# Patient Record
Sex: FEMALE | Race: BLACK OR AFRICAN AMERICAN | NOT HISPANIC OR LATINO | Employment: STUDENT | URBAN - METROPOLITAN AREA
[De-identification: names, ages, dates, MRNs, and addresses within clinical notes are randomized per-mention and may not be internally consistent; named-entity substitution may affect disease eponyms.]

---

## 2020-02-23 ENCOUNTER — HOSPITAL ENCOUNTER (EMERGENCY)
Facility: HOSPITAL | Age: 20
Discharge: HOME | End: 2020-02-23
Attending: EMERGENCY MEDICINE
Payer: COMMERCIAL

## 2020-02-23 VITALS
SYSTOLIC BLOOD PRESSURE: 100 MMHG | DIASTOLIC BLOOD PRESSURE: 79 MMHG | OXYGEN SATURATION: 100 % | HEART RATE: 67 BPM | TEMPERATURE: 97.9 F | RESPIRATION RATE: 18 BRPM

## 2020-02-23 DIAGNOSIS — I80.8 SUPERFICIAL THROMBOPHLEBITIS OF RIGHT UPPER EXTREMITY: Primary | ICD-10-CM

## 2020-02-23 PROCEDURE — 99283 EMERGENCY DEPT VISIT LOW MDM: CPT

## 2020-02-23 PROCEDURE — 99284 EMERGENCY DEPT VISIT MOD MDM: CPT | Performed by: EMERGENCY MEDICINE

## 2020-02-23 RX ORDER — IBUPROFEN 400 MG/1
400 TABLET ORAL ONCE
Status: COMPLETED | OUTPATIENT
Start: 2020-02-23 | End: 2020-02-23

## 2020-02-23 RX ORDER — CEPHALEXIN 250 MG/1
500 CAPSULE ORAL ONCE
Status: COMPLETED | OUTPATIENT
Start: 2020-02-23 | End: 2020-02-23

## 2020-02-23 RX ORDER — CEPHALEXIN 500 MG/1
500 CAPSULE ORAL EVERY 12 HOURS SCHEDULED
Qty: 10 CAPSULE | Refills: 0 | Status: SHIPPED | OUTPATIENT
Start: 2020-02-23 | End: 2020-02-28

## 2020-02-23 RX ADMIN — IBUPROFEN 400 MG: 400 TABLET ORAL at 22:53

## 2020-02-23 RX ADMIN — CEPHALEXIN 500 MG: 250 CAPSULE ORAL at 22:53

## 2020-02-24 NOTE — ED ATTENDING ATTESTATION
2/23/2020  I, Jaki Marquez MD, saw and evaluated the patient  I have discussed the patient with the resident/non-physician practitioner and agree with the resident's/non-physician practitioner's findings, Plan of Care, and MDM as documented in the resident's/non-physician practitioner's note, except where noted  All available labs and Radiology studies were reviewed  I was present for key portions of any procedure(s) performed by the resident/non-physician practitioner and I was immediately available to provide assistance  At this point I agree with the current assessment done in the Emergency Department  I have conducted an independent evaluation of this patient a history and physical is as follows:    ED Course         Critical Care Time  Procedures    Patient is a pleasant 20 yof who presents with superficial thrombophlebitis after a IV placement  Right AC  Present for the past two days  Some mild associated redness  No arm edema  MDM 19 yof, some achi non-radiating pain over the area  Will treat as superficial thrombophlebitis with mild cellulitis  Discussed return precautions for DVT  Discussed with the patient who agrees with the workup and plan, understands return precautions and followup instructions

## 2020-02-24 NOTE — ED PROVIDER NOTES
History  Chief Complaint   Patient presents with    Arm Pain     Pt reports going to ER at school on Thursday and having IV placed in R arm; pt states site is now painful with extension of arm; denies fevers/chills     43-year-old woman with a past medical history of asthma presents for evaluation of right arm pain  Patient states that 4 days ago she had an IV placed at an outside hospital for IV hydration secondary to gastroenteritis  She states that her symptoms have improved  Of the past 24 hours she has noticed pain in her right arm at the IV site  She has noticed some swelling/hardness in her right antecubital fossa  She denies rash  She denies any trauma  The symptoms are worse with moving her arm and relieved with rest   She is right-hand dominant  She has not taken any medication for the symptoms  She denies shortness of breath, chest pain  She has no other symptoms on review of systems          None       Past Medical History:   Diagnosis Date    Asthma        History reviewed  No pertinent surgical history  History reviewed  No pertinent family history  I have reviewed and agree with the history as documented  Social History     Tobacco Use    Smoking status: Never Smoker    Smokeless tobacco: Never Used   Substance Use Topics    Alcohol use: Not Currently    Drug use: Not Currently        Review of Systems   Constitutional: Negative for appetite change, chills, diaphoresis, fatigue and fever  HENT: Negative for congestion, rhinorrhea and sore throat  Respiratory: Negative for cough, shortness of breath, wheezing and stridor  Cardiovascular: Negative for chest pain, palpitations and leg swelling  Gastrointestinal: Negative for abdominal distention, abdominal pain, constipation, diarrhea, nausea and vomiting  Endocrine: Negative for polydipsia and polyuria  Genitourinary: Negative for dysuria and hematuria  Musculoskeletal: Positive for arthralgias (Right arm)  Negative for back pain, neck pain and neck stiffness  Skin: Negative for pallor, rash and wound  Neurological: Negative for dizziness, light-headedness and headaches  Psychiatric/Behavioral: Negative for behavioral problems and confusion  All other systems reviewed and are negative  Physical Exam  ED Triage Vitals [02/23/20 2212]   Temperature Pulse Respirations Blood Pressure SpO2   97 9 °F (36 6 °C) 67 18 100/79 100 %      Temp Source Heart Rate Source Patient Position - Orthostatic VS BP Location FiO2 (%)   Oral Monitor Sitting Left arm --      Pain Score       8             Orthostatic Vital Signs  Vitals:    02/23/20 2212   BP: 100/79   Pulse: 67   Patient Position - Orthostatic VS: Sitting       Physical Exam   Constitutional: She is oriented to person, place, and time  She appears well-developed and well-nourished  No distress  HENT:   Head: Normocephalic and atraumatic  Eyes: Conjunctivae are normal  No scleral icterus  Neck: Normal range of motion  Neck supple  Cardiovascular: Normal rate, regular rhythm, normal heart sounds and intact distal pulses  No murmur heard  Pulmonary/Chest: Effort normal and breath sounds normal  No respiratory distress  She has no wheezes  Abdominal: Soft  Bowel sounds are normal  She exhibits no distension  There is no tenderness  Musculoskeletal: Normal range of motion  She exhibits no edema, tenderness or deformity  Arms:  Neurological: She is alert and oriented to person, place, and time  Skin: Skin is warm and dry  No rash noted  She is not diaphoretic  No erythema  No pallor  Psychiatric: She has a normal mood and affect  Her behavior is normal    Nursing note and vitals reviewed        ED Medications  Medications   cephalexin (KEFLEX) capsule 500 mg (500 mg Oral Given 2/23/20 2253)   ibuprofen (MOTRIN) tablet 400 mg (400 mg Oral Given 2/23/20 2253)       Diagnostic Studies  Results Reviewed     None                 No orders to display Procedures  Procedures      ED Course                               MDM  Number of Diagnoses or Management Options  Superficial thrombophlebitis of right upper extremity: new and requires workup  Diagnosis management comments: Superficial thrombophlebitis following IV stick  There is no obvious rash, redness the patient is dark complexion  Will treat with Keflex for 5 days, NSAIDs, warm compresses  Return precautions discussed  Amount and/or Complexity of Data Reviewed  Decide to obtain previous medical records or to obtain history from someone other than the patient: yes  Obtain history from someone other than the patient: yes  Review and summarize past medical records: yes  Discuss the patient with other providers: yes  Independent visualization of images, tracings, or specimens: yes    Risk of Complications, Morbidity, and/or Mortality  Presenting problems: minimal  Diagnostic procedures: minimal  Management options: minimal    Patient Progress  Patient progress: stable        Disposition  Final diagnoses:   Superficial thrombophlebitis of right upper extremity     Time reflects when diagnosis was documented in both MDM as applicable and the Disposition within this note     Time User Action Codes Description Comment    2/23/2020 10:43 PM Jorgito Rivera Add [I80 8] Superficial thrombophlebitis of right upper extremity       ED Disposition     ED Disposition Condition Date/Time Comment    Discharge Stable Sun Feb 23, 2020 10:42 PM Bel Rodrigues discharge to home/self care              Follow-up Information     Follow up With Specialties Details Why Contact Info Additional [address] MD Family Medicine   [place] [id number]       [address] [place] [address] Emergency Department Emergency Medicine   [address] [address] [id number]  [phone]  ED, [address] [place] [address] Discharge Medication List as of 2/23/2020 10:43 PM      START taking these medications    Details   cephalexin (KEFLEX) 500 mg capsule Take 1 capsule (500 mg total) by mouth every 12 (twelve) hours for 5 days, Starting Sun 2/23/2020, Until Fri 2/28/2020, Print           No discharge procedures on file  ED Provider  Attending physically available and evaluated Tyson Gomes I managed the patient along with the ED Attending      Electronically Signed by         Ina Navas MD  02/24/20 6896

## 2021-03-31 DIAGNOSIS — Z23 ENCOUNTER FOR IMMUNIZATION: ICD-10-CM

## 2024-04-05 ENCOUNTER — HOSPITAL ENCOUNTER (EMERGENCY)
Facility: HOSPITAL | Age: 24
Discharge: HOME | End: 2024-04-06
Attending: EMERGENCY MEDICINE
Payer: COMMERCIAL

## 2024-04-05 ENCOUNTER — APPOINTMENT (EMERGENCY)
Dept: RADIOLOGY | Facility: HOSPITAL | Age: 24
End: 2024-04-05
Payer: COMMERCIAL

## 2024-04-05 VITALS
RESPIRATION RATE: 18 BRPM | DIASTOLIC BLOOD PRESSURE: 77 MMHG | WEIGHT: 130 LBS | BODY MASS INDEX: 23.04 KG/M2 | OXYGEN SATURATION: 100 % | HEART RATE: 96 BPM | HEIGHT: 63 IN | TEMPERATURE: 98.4 F | SYSTOLIC BLOOD PRESSURE: 104 MMHG

## 2024-04-05 DIAGNOSIS — N39.0 UTI (URINARY TRACT INFECTION): ICD-10-CM

## 2024-04-05 DIAGNOSIS — K59.00 CONSTIPATION: ICD-10-CM

## 2024-04-05 DIAGNOSIS — K64.4 EXTERNAL HEMORRHOID: Primary | ICD-10-CM

## 2024-04-05 LAB
BACTERIA UR QL AUTO: ABNORMAL /HPF
BILIRUB UR QL STRIP: NEGATIVE
CLARITY UR: CLEAR
COLOR UR: ABNORMAL
GLUCOSE UR STRIP-MCNC: NEGATIVE MG/DL
HGB UR QL STRIP.AUTO: ABNORMAL
KETONES UR STRIP-MCNC: NEGATIVE MG/DL
LEUKOCYTE ESTERASE UR QL STRIP: ABNORMAL
MUCOUS THREADS UR QL AUTO: ABNORMAL
NITRITE UR QL STRIP: NEGATIVE
NON-SQ EPI CELLS URNS QL MICRO: ABNORMAL /HPF
PH UR STRIP.AUTO: 6 [PH]
PROT UR STRIP-MCNC: ABNORMAL MG/DL
RBC #/AREA URNS AUTO: ABNORMAL /HPF
SP GR UR STRIP.AUTO: 1.02 (ref 1–1.03)
UROBILINOGEN UR STRIP-ACNC: <2 MG/DL
WBC #/AREA URNS AUTO: ABNORMAL /HPF

## 2024-04-05 PROCEDURE — 81001 URINALYSIS AUTO W/SCOPE: CPT

## 2024-04-05 PROCEDURE — 74018 RADEX ABDOMEN 1 VIEW: CPT

## 2024-04-05 PROCEDURE — 99284 EMERGENCY DEPT VISIT MOD MDM: CPT

## 2024-04-05 PROCEDURE — 96361 HYDRATE IV INFUSION ADD-ON: CPT

## 2024-04-05 PROCEDURE — 82272 OCCULT BLD FECES 1-3 TESTS: CPT

## 2024-04-05 PROCEDURE — 99283 EMERGENCY DEPT VISIT LOW MDM: CPT

## 2024-04-05 PROCEDURE — 87086 URINE CULTURE/COLONY COUNT: CPT

## 2024-04-05 RX ADMIN — SODIUM CHLORIDE 1000 ML: 0.9 INJECTION, SOLUTION INTRAVENOUS at 22:46

## 2024-04-05 RX ADMIN — GLYCERIN 1 SUPPOSITORY: 2 SUPPOSITORY RECTAL at 22:39

## 2024-04-06 PROCEDURE — 96365 THER/PROPH/DIAG IV INF INIT: CPT

## 2024-04-06 RX ORDER — CEPHALEXIN 500 MG/1
500 CAPSULE ORAL 4 TIMES DAILY
Qty: 20 CAPSULE | Refills: 0 | Status: SHIPPED | OUTPATIENT
Start: 2024-04-06 | End: 2024-04-11

## 2024-04-06 RX ADMIN — DEXTROSE 1000 MG: 50 INJECTION, SOLUTION INTRAVENOUS at 00:07

## 2024-04-06 NOTE — DISCHARGE INSTRUCTIONS
Continue taking Colace.  Increase your fluid intake and increase your fiber intake.  Please take your ceftriaxone for your UTI 4 times a day for the next 5 days. Please use Tucks topical pads to treat the hemorrhoid. Please follow up with obgyn and your primary care regarding your visit today. Return to the ED if your symptoms return or if you experience significant rectal bleeding.

## 2024-04-06 NOTE — ED PROVIDER NOTES
History  Chief Complaint   Patient presents with    Rectal Pain     Pt had a baby a couple weeks ago and has a hemorrhoid that she noticed today. Last BM was last Friday. Has tried colace and a fleet enema with no relief. Reports it has been burning when she urinates started yesterday      Bel Rodrigues is a 23 y.o. female with a PMH of asthma presenting to the ED on April 5, 2024 with rectal pain. Patient endorses that she had a baby on 317 via vaginal delivery.  She states that she has not been able to have a bowel movement since Friday.  She tried to do an over the counter fleets enema at home but was unable to insert the enema fully.  She is also tried taking Colace at home without relief.  Patient states that she has had rectal pain since she has been constipated however did not notice that she had a large hemorrhoid until today when she attempted the enema.  Patient is also endorsing dysuria and frequency today. Patient denies blood in her urine, abdominal pain, flank tenderness, bright red blood per rectum or any other complaints at this time.             None       Past Medical History:   Diagnosis Date    Asthma        History reviewed. No pertinent surgical history.    History reviewed. No pertinent family history.  I have reviewed and agree with the history as documented.    E-Cigarette/Vaping    E-Cigarette Use Never User      E-Cigarette/Vaping Substances     Social History     Tobacco Use    Smoking status: Never    Smokeless tobacco: Never   Vaping Use    Vaping status: Never Used   Substance Use Topics    Alcohol use: Not Currently    Drug use: Not Currently       Review of Systems   Constitutional:  Negative for chills and fever.   Eyes:  Negative for pain and visual disturbance.   Respiratory:  Negative for cough and shortness of breath.    Cardiovascular:  Negative for chest pain and palpitations.   Gastrointestinal:  Positive for constipation. Negative for abdominal pain, nausea and vomiting.    Genitourinary:  Positive for dysuria and frequency. Negative for difficulty urinating, flank pain, hematuria and urgency.   Musculoskeletal:  Negative for arthralgias and back pain.   Skin:  Negative for color change and rash.   Neurological:  Negative for seizures and syncope.   All other systems reviewed and are negative.      Physical Exam  Physical Exam  Vitals and nursing note reviewed.   Constitutional:       General: She is not in acute distress.     Appearance: Normal appearance. She is normal weight. She is not ill-appearing, toxic-appearing or diaphoretic.   HENT:      Head: Normocephalic and atraumatic.      Right Ear: External ear normal.      Left Ear: External ear normal.      Nose: Nose normal. No congestion or rhinorrhea.      Mouth/Throat:      Mouth: Mucous membranes are moist.   Eyes:      General: No scleral icterus.        Right eye: No discharge.         Left eye: No discharge.      Extraocular Movements: Extraocular movements intact.      Conjunctiva/sclera: Conjunctivae normal.   Cardiovascular:      Rate and Rhythm: Normal rate and regular rhythm.      Heart sounds: Normal heart sounds. No murmur heard.     No friction rub. No gallop.   Pulmonary:      Effort: Pulmonary effort is normal. No respiratory distress.      Breath sounds: Normal breath sounds. No wheezing, rhonchi or rales.   Abdominal:      General: Bowel sounds are normal. There is no distension.      Palpations: Abdomen is soft.      Tenderness: There is abdominal tenderness. There is no guarding or rebound.      Comments: Tenderness to deep palpation in her left lower quadrant   Genitourinary:     Rectum: Guaiac result positive. External hemorrhoid present. No anal fissure or internal hemorrhoid. Normal anal tone.       Musculoskeletal:         General: No signs of injury. Normal range of motion.      Cervical back: Normal range of motion and neck supple. No rigidity.   Skin:     General: Skin is warm.      Capillary Refill:  Capillary refill takes less than 2 seconds.      Coloration: Skin is not pale.      Findings: No erythema.   Neurological:      General: No focal deficit present.      Mental Status: She is alert and oriented to person, place, and time. Mental status is at baseline.      Motor: No weakness.      Gait: Gait normal.   Psychiatric:         Mood and Affect: Mood normal.         Behavior: Behavior normal.         Vital Signs  ED Triage Vitals [04/05/24 2111]   Temperature Pulse Respirations Blood Pressure SpO2   98.4 °F (36.9 °C) 96 18 104/77 100 %      Temp Source Heart Rate Source Patient Position - Orthostatic VS BP Location FiO2 (%)   Oral Monitor -- Right arm --      Pain Score       --           Vitals:    04/05/24 2111   BP: 104/77   Pulse: 96         Visual Acuity      ED Medications  Medications   glycerin (adult) rectal suppository 1 suppository (1 suppository Rectal Given 4/5/24 2239)   sodium chloride 0.9 % bolus 1,000 mL (0 mL Intravenous Stopped 4/6/24 0007)   ceftriaxone (ROCEPHIN) 1 g/50 mL in dextrose IVPB (0 mg Intravenous Stopped 4/6/24 0047)       Diagnostic Studies  Results Reviewed       Procedure Component Value Units Date/Time    Urine Microscopic [[id number]]  (Abnormal) Collected: 04/05/24 2150    Lab Status: Final result Specimen: Urine, Clean Catch Updated: 04/05/24 2311     RBC, UA 4-10 /hpf      WBC, UA 10-20 /hpf      Epithelial Cells None Seen /hpf      Bacteria, UA Occasional /hpf      MUCUS THREADS Occasional    Urine culture [[id number]] Collected: 04/05/24 2150    Lab Status: In process Specimen: Urine, Clean Catch Updated: 04/05/24 2311    UA w Reflex to Microscopic w Reflex to Culture [[id number]]  (Abnormal) Collected: 04/05/24 2150    Lab Status: Final result Specimen: Urine, Clean Catch Updated: 04/05/24 2241     Color, UA Light Yellow     Clarity, UA Clear     Specific Gravity, UA 1.025     pH, UA 6.0     Leukocytes, UA Large     Nitrite, UA Negative     Protein, UA Trace mg/dl       Glucose, UA Negative mg/dl      Ketones, UA Negative mg/dl      Urobilinogen, UA <2.0 mg/dl      Bilirubin, UA Negative     Occult Blood, UA Small                   XR abdomen 1 view kub   ED Interpretation by Lexis Duque PA-C (04/05 2232)   Constipation noted                 Procedures  Procedures         ED Course  ED Course as of 04/06/24 0221 Fri Apr 05, 2024   2313 Urine Microscopic(!)  Will treat                                             Medical Decision Making  Patient seen and examined noted to have external hemorrhoid, constipation and UTI.  Chaperone was present when rectal exam was performed, large external hemorrhoid noted.  No internal hemorrhoids on my exam.  Patient states that she is not able to have a bowel movement for over a week.  Gave patient a glycerin suppository everything and was unsuccessful.  Moved to a soapsuds enema which relieved patient's constipation.  Patient was prescribed Tucks pads to use for her hemorrhoid.  She was prescribed Keflex to treat her UTI and she received her first dose of Rocephin here through her IV.  Strict return precautions were given which patient expressed her understanding of.    Differential diagnosis includes but is not limited to hemorrhoid, fissure, constipation, obstipation, pseudo-obstruction, fecal impaction, bowel obstruction, ileus; doubt acute surgical intraabdominal process.       Patient's labs notable for: Urinary tract infection    Imaging revealed:   Constipation on KUB     Patient appears well, is nontoxic appearing, she expresses understanding and agrees with plan of care at this time.  In light of this patient would benefit from outpatient management.    Patient was rx'd: keflex and tucks pads    Patient at time of discharge well-appearing in no acute distress, all questions answered. Patient agreeable to plan.  Patient's vitals, lab/imaging results, diagnosis, and treatment plan were discussed with the patient. All new/changed  "medications were discussed with patient, specifically, route of administration, how often and when to take, and where they can be picked up. Strict return precautions as well as close follow up with PCP was discussed with the patient and the patient was agreeable to my recommendations. Patient verbally acknowledged understanding of the above communications. Strict return precautions were provided. All labs reviewed and utilized in the medical decision making process (if labs were ordered). Portions of the record may have been created with voice recognition software.  Occasional wrong word or \"sound a like\" substitutions may have occurred due to the inherent limitations of voice recognition software.  Read the chart carefully and recognize, using context, where substitutions have occurred.      Amount and/or Complexity of Data Reviewed  Labs: ordered. Decision-making details documented in ED Course.  Radiology: ordered and independent interpretation performed.    Risk  OTC drugs.  Prescription drug management.             Disposition  Final diagnoses:   External hemorrhoid   Constipation   UTI (urinary tract infection)     Time reflects when diagnosis was documented in both MDM as applicable and the Disposition within this note       Time User Action Codes Description Comment    4/6/2024 12:12 AM Lexis Duque Add [K64.4] External hemorrhoid     4/6/2024 12:12 AM Lexis Duque Add [K59.00] Constipation     4/6/2024 12:13 AM Lexis Duque Add [N39.0] UTI (urinary tract infection)           ED Disposition       ED Disposition   Discharge    Condition   Stable    Date/Time   Sat Apr 6, 2024 12:12 AM    Comment   Bel Rodrigues discharge to home/self care.                   Follow-up Information       Follow up With Specialties Details Why Contact Info Additional Information    Kaleigh Perez MD Family Medicine   [address] [place] [address]  [address]  [facility] [id number] [phone]       Critical access hospital " Emergency Department Emergency Medicine   [address] [facility] [address]  [phone] [facility] Emergency Department, [address] [place], [place], [address]            Discharge Medication List as of 4/6/2024 12:22 AM        START taking these medications    Details   cephalexin (KEFLEX) 500 mg capsule Take 1 capsule (500 mg total) by mouth 4 (four) times a day for 5 days, Starting Sat 4/6/2024, Until Thu 4/11/2024, Normal      witch hazel-glycerin (TUCKS) topical pad Insert 1 Pad into the rectum as needed for irritation, Starting Sat 4/6/2024, Normal             No discharge procedures on file.    PDMP Review       None            ED Provider  Electronically Signed by             Lexis Duque PA-C  04/06/24 0225

## 2024-04-07 LAB — BACTERIA UR CULT: NORMAL

## 2024-04-08 LAB — BACTERIA UR CULT: NORMAL

## 2025-07-25 ENCOUNTER — OFFICE VISIT (OUTPATIENT)
Dept: URGENT CARE | Age: 25
End: 2025-07-25
Payer: COMMERCIAL

## 2025-07-25 VITALS
TEMPERATURE: 98.2 F | SYSTOLIC BLOOD PRESSURE: 99 MMHG | RESPIRATION RATE: 20 BRPM | DIASTOLIC BLOOD PRESSURE: 76 MMHG | HEART RATE: 92 BPM | OXYGEN SATURATION: 98 %

## 2025-07-25 DIAGNOSIS — S61.001A AVULSION OF SKIN OF RIGHT THUMB, INITIAL ENCOUNTER: Primary | ICD-10-CM

## 2025-07-25 PROCEDURE — 12001 RPR S/N/AX/GEN/TRNK 2.5CM/<: CPT | Performed by: NURSE PRACTITIONER

## 2025-07-25 PROCEDURE — S9083 URGENT CARE CENTER GLOBAL: HCPCS | Performed by: NURSE PRACTITIONER

## 2025-07-25 PROCEDURE — G0382 LEV 3 HOSP TYPE B ED VISIT: HCPCS | Performed by: NURSE PRACTITIONER

## 2025-07-25 NOTE — PROGRESS NOTES
[facility] Now  Name: Bel Rodrigues      : 2000      MRN: [id number]  Encounter Provider: JOSS Woodward  Encounter Date: 2025   Encounter department: [facility] [place]  :  Assessment & Plan  Avulsion of skin of right thumb, initial encounter             Patient Instructions      Patient Instructions   --Keep glued area clean, dry and covered for 24 hours. After 24 hours, may shower, but no soaking in tub or swimming until glue dissolves.  --Expect glue to dissolve and/or Steri-strips to fall off in 7-14 days.    --Protect area by keeping covered with Bandaid or nonstick dressing, changing when wet or as needed.  Avoid applying tape or sticky portion of Bandaid directly to glued area.    --Avoid applying any ointments to glued areas, as these may cause the glue to prematurely dissolve.  --Monitor wound for signs and symptoms of infection such as increased redness, tenderness, swelling, drainage, as well as signs of wound separation or re-bleeding.  Contact us or your primary care provider should these occur.       If tests are performed, our office will contact you with results only if changes need to made to the care plan discussed with you at the visit. You can review your full results on [facility].    Chief Complaint:   Chief Complaint   Patient presents with    Laceration     Right thumb laceration it happen half hour .     History of Present Illness   Here with complaints to cut to right thumb.  Occurred approximately 30 minutes ago while she was slicing an onion using an onion slicer in her kitchen.  Accidentally got too close to her thumb and sliced off a little piece of skin.  Immediate bleeding noted with some pain.  Rinsed under water and applied pressure with dressing.  No further bleeding noted at present.  No numbness or tingling.  Tdap .  Right-hand-dominant.  Denies aspirin or blood thinner use.          Review of Systems   Constitutional:  Negative for  fever.   Skin:  Positive for wound.     Past Medical History   Past Medical History[1]  Past Surgical History[1]  Family History[1]  she reports that she has never smoked. She has never used smokeless tobacco. She reports that she does not currently use alcohol. She reports that she does not currently use drugs.  Current Outpatient Medications   Medication Instructions    witch hazel-glycerin (TUCKS) topical pad 1 Pad, Rectal, As needed   Allergies[2]     Objective   BP 99/76   Pulse 92   Temp 98.2 °F (36.8 °C) (Tympanic)   Resp 20   SpO2 98%      Physical Exam    Skin:     Findings: Lesion present.      Comments: Radial aspect of distal right thumb with small (2 x 5 mm) elliptical shaped, superficial skin avulsion just lateral to nail with clean underlying subdermal tissue no active bleeding, No foreign body noted.  No nail involvement noted.  No sign of nerve, vascular, tendon, bone injury.  Remainder of thumb nontender with normal appearance.  Normal sensation, color, temp and refill.  Normal ROM.     Neurological:      Mental Status: She is alert.         Universal Protocol:  procedure performed by consultantConsent: Verbal consent obtained  Risks and benefits: risks, benefits and alternatives were discussed  Consent given by: patient  Patient understanding: patient states understanding of the procedure being performed  Patient consent: the patient's understanding of the procedure matches consent given  Procedure consent: procedure consent matches procedure scheduled  Patient identity confirmed: verbally with patient  Laceration repair    Date/Time: 7/25/2025 7:30 PM    Performed by: JOSS Woodward  Authorized by: JOSS Woodward  Body area: upper extremity  Location details: right thumb  Laceration length: 0.5 cm  Foreign bodies: no foreign bodies  Tendon involvement: none  Nerve involvement: none  Vascular damage: no    Wound Dehiscence:  Superficial Wound Dehiscence: simple closure      Procedure  "Details:  Irrigation solution: saline  Irrigation method: syringe  Amount of cleaning: standard  Debridement: none  Degree of undermining: none  Skin closure: glue  Patient tolerance: patient tolerated the procedure well with no immediate complications         Portions of the record may have been created with voice recognition software.  Occasional wrong word or \"sound a like\" substitutions may have occurred due to the inherent limitations of voice recognition software.  Read the chart carefully and recognize, using context, where substitutions have occurred.       [1]   Past Medical History:  Diagnosis Date    Asthma    [1] No past surgical history on file.  [1] No family history on file.  [2] No Known Allergies    "

## 2025-07-25 NOTE — PATIENT INSTRUCTIONS
--Keep glued area clean, dry and covered for 24 hours. After 24 hours, may shower, but no soaking in tub or swimming until glue dissolves.  --Expect glue to dissolve and/or Steri-strips to fall off in 7-14 days.    --Protect area by keeping covered with Bandaid or nonstick dressing, changing when wet or as needed.  Avoid applying tape or sticky portion of Bandaid directly to glued area.    --Avoid applying any ointments to glued areas, as these may cause the glue to prematurely dissolve.  --Monitor wound for signs and symptoms of infection such as increased redness, tenderness, swelling, drainage, as well as signs of wound separation or re-bleeding.  Contact us or your primary care provider should these occur.